# Patient Record
Sex: FEMALE | Race: BLACK OR AFRICAN AMERICAN | NOT HISPANIC OR LATINO | URBAN - METROPOLITAN AREA
[De-identification: names, ages, dates, MRNs, and addresses within clinical notes are randomized per-mention and may not be internally consistent; named-entity substitution may affect disease eponyms.]

---

## 2021-12-28 ENCOUNTER — OUTPATIENT (OUTPATIENT)
Dept: OUTPATIENT SERVICES | Facility: HOSPITAL | Age: 31
LOS: 1 days | End: 2021-12-28

## 2021-12-28 ENCOUNTER — APPOINTMENT (OUTPATIENT)
Dept: PRIMARY CARE | Facility: HOSPITAL | Age: 31
End: 2021-12-28

## 2021-12-28 VITALS
OXYGEN SATURATION: 100 % | WEIGHT: 190 LBS | TEMPERATURE: 98.1 F | DIASTOLIC BLOOD PRESSURE: 57 MMHG | HEIGHT: 66 IN | BODY MASS INDEX: 30.53 KG/M2 | HEART RATE: 62 BPM | SYSTOLIC BLOOD PRESSURE: 127 MMHG

## 2021-12-28 DIAGNOSIS — R52 PAIN, UNSPECIFIED: ICD-10-CM

## 2021-12-28 LAB
INFLUENZA A RESULT: NOT DETECTED
INFLUENZA B RESULT: NOT DETECTED
RESP SYN VIRUS RESULT: NOT DETECTED
SARS-COV-2 RESULT: NOT DETECTED

## 2021-12-28 NOTE — HISTORY OF PRESENT ILLNESS
[FreeTextEntry8] : 31F NKDA with no PMH and no PSH who came her due to fever and body aches.\par \par States that she had body aches and fever for 2 days. She was also exposed to + COVID co-worker for the past 2 days. She received th 2nd dose of COVID in October 2021 and did not receive the flu shot.  Denies any  cough, sore throat or SOB. No loss of smell or taste, no chest tightness, or any GI related symptoms of nausea, vomiting or diarrhea. 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 Does not / Does take regular maintenance medications. Denies any chest pain, no chest palpitations or any respiratory distress\par \par \par

## 2021-12-28 NOTE — PHYSICAL EXAM
[No Acute Distress] : no acute distress [Normal Sclera/Conjunctiva] : normal sclera/conjunctiva [Normal Outer Ear/Nose] : the outer ears and nose were normal in appearance [No Respiratory Distress] : no respiratory distress  [No Accessory Muscle Use] : no accessory muscle use [Clear to Auscultation] : lungs were clear to auscultation bilaterally [Regular Rhythm] : with a regular rhythm [Normal S1, S2] : normal S1 and S2 [No Focal Deficits] : no focal deficits [Normal Gait] : normal gait [Normal Affect] : the affect was normal [Normal Insight/Judgement] : insight and judgment were intact [de-identified] : no tonsular exudates, no tonsular erythema and no maxillary tenderness  [de-identified] : no wheezing, no rhonchi and no crackles

## 2022-04-01 ENCOUNTER — EMERGENCY (EMERGENCY)
Facility: HOSPITAL | Age: 32
LOS: 1 days | Discharge: HOME | End: 2022-04-01
Attending: STUDENT IN AN ORGANIZED HEALTH CARE EDUCATION/TRAINING PROGRAM | Admitting: STUDENT IN AN ORGANIZED HEALTH CARE EDUCATION/TRAINING PROGRAM
Payer: MEDICAID

## 2022-04-01 VITALS
RESPIRATION RATE: 18 BRPM | HEIGHT: 66 IN | HEART RATE: 80 BPM | OXYGEN SATURATION: 100 % | SYSTOLIC BLOOD PRESSURE: 138 MMHG | TEMPERATURE: 98 F | DIASTOLIC BLOOD PRESSURE: 60 MMHG

## 2022-04-01 LAB
ALBUMIN SERPL ELPH-MCNC: 4.2 G/DL — SIGNIFICANT CHANGE UP (ref 3.3–5)
ALP SERPL-CCNC: 55 U/L — SIGNIFICANT CHANGE UP (ref 40–120)
ALT FLD-CCNC: 18 U/L — SIGNIFICANT CHANGE UP (ref 4–33)
ANION GAP SERPL CALC-SCNC: 15 MMOL/L — HIGH (ref 7–14)
APPEARANCE UR: CLEAR — SIGNIFICANT CHANGE UP
AST SERPL-CCNC: 16 U/L — SIGNIFICANT CHANGE UP (ref 4–32)
BASOPHILS # BLD AUTO: 0.04 K/UL — SIGNIFICANT CHANGE UP (ref 0–0.2)
BASOPHILS NFR BLD AUTO: 0.5 % — SIGNIFICANT CHANGE UP (ref 0–2)
BILIRUB SERPL-MCNC: 0.2 MG/DL — SIGNIFICANT CHANGE UP (ref 0.2–1.2)
BILIRUB UR-MCNC: NEGATIVE — SIGNIFICANT CHANGE UP
BLD GP AB SCN SERPL QL: NEGATIVE — SIGNIFICANT CHANGE UP
BUN SERPL-MCNC: 10 MG/DL — SIGNIFICANT CHANGE UP (ref 7–23)
CALCIUM SERPL-MCNC: 8.9 MG/DL — SIGNIFICANT CHANGE UP (ref 8.4–10.5)
CHLORIDE SERPL-SCNC: 107 MMOL/L — SIGNIFICANT CHANGE UP (ref 98–107)
CO2 SERPL-SCNC: 23 MMOL/L — SIGNIFICANT CHANGE UP (ref 22–31)
COLOR SPEC: YELLOW — SIGNIFICANT CHANGE UP
CREAT SERPL-MCNC: 0.82 MG/DL — SIGNIFICANT CHANGE UP (ref 0.5–1.3)
DIFF PNL FLD: NEGATIVE — SIGNIFICANT CHANGE UP
EGFR: 97 ML/MIN/1.73M2 — SIGNIFICANT CHANGE UP
EOSINOPHIL # BLD AUTO: 0.16 K/UL — SIGNIFICANT CHANGE UP (ref 0–0.5)
EOSINOPHIL NFR BLD AUTO: 1.8 % — SIGNIFICANT CHANGE UP (ref 0–6)
GLUCOSE SERPL-MCNC: 100 MG/DL — HIGH (ref 70–99)
GLUCOSE UR QL: NEGATIVE — SIGNIFICANT CHANGE UP
HCG SERPL-ACNC: 571.2 MIU/ML — SIGNIFICANT CHANGE UP
HCT VFR BLD CALC: 39.1 % — SIGNIFICANT CHANGE UP (ref 34.5–45)
HGB BLD-MCNC: 13.3 G/DL — SIGNIFICANT CHANGE UP (ref 11.5–15.5)
IANC: 3.75 K/UL — SIGNIFICANT CHANGE UP (ref 1.8–7.4)
IMM GRANULOCYTES NFR BLD AUTO: 0.3 % — SIGNIFICANT CHANGE UP (ref 0–1.5)
KETONES UR-MCNC: NEGATIVE — SIGNIFICANT CHANGE UP
LEUKOCYTE ESTERASE UR-ACNC: NEGATIVE — SIGNIFICANT CHANGE UP
LYMPHOCYTES # BLD AUTO: 4.01 K/UL — HIGH (ref 1–3.3)
LYMPHOCYTES # BLD AUTO: 45.9 % — HIGH (ref 13–44)
MCHC RBC-ENTMCNC: 31.8 PG — SIGNIFICANT CHANGE UP (ref 27–34)
MCHC RBC-ENTMCNC: 34 GM/DL — SIGNIFICANT CHANGE UP (ref 32–36)
MCV RBC AUTO: 93.5 FL — SIGNIFICANT CHANGE UP (ref 80–100)
MONOCYTES # BLD AUTO: 0.74 K/UL — SIGNIFICANT CHANGE UP (ref 0–0.9)
MONOCYTES NFR BLD AUTO: 8.5 % — SIGNIFICANT CHANGE UP (ref 2–14)
NEUTROPHILS # BLD AUTO: 3.75 K/UL — SIGNIFICANT CHANGE UP (ref 1.8–7.4)
NEUTROPHILS NFR BLD AUTO: 43 % — SIGNIFICANT CHANGE UP (ref 43–77)
NITRITE UR-MCNC: NEGATIVE — SIGNIFICANT CHANGE UP
NRBC # BLD: 0 /100 WBCS — SIGNIFICANT CHANGE UP
NRBC # FLD: 0 K/UL — SIGNIFICANT CHANGE UP
PH UR: 6.5 — SIGNIFICANT CHANGE UP (ref 5–8)
PLATELET # BLD AUTO: 197 K/UL — SIGNIFICANT CHANGE UP (ref 150–400)
POTASSIUM SERPL-MCNC: 4.4 MMOL/L — SIGNIFICANT CHANGE UP (ref 3.5–5.3)
POTASSIUM SERPL-SCNC: 4.4 MMOL/L — SIGNIFICANT CHANGE UP (ref 3.5–5.3)
PROT SERPL-MCNC: 6.7 G/DL — SIGNIFICANT CHANGE UP (ref 6–8.3)
PROT UR-MCNC: ABNORMAL
RBC # BLD: 4.18 M/UL — SIGNIFICANT CHANGE UP (ref 3.8–5.2)
RBC # FLD: 13.2 % — SIGNIFICANT CHANGE UP (ref 10.3–14.5)
RH IG SCN BLD-IMP: POSITIVE — SIGNIFICANT CHANGE UP
SODIUM SERPL-SCNC: 145 MMOL/L — SIGNIFICANT CHANGE UP (ref 135–145)
SP GR SPEC: 1.03 — SIGNIFICANT CHANGE UP (ref 1–1.05)
UROBILINOGEN FLD QL: SIGNIFICANT CHANGE UP
WBC # BLD: 8.73 K/UL — SIGNIFICANT CHANGE UP (ref 3.8–10.5)
WBC # FLD AUTO: 8.73 K/UL — SIGNIFICANT CHANGE UP (ref 3.8–10.5)

## 2022-04-01 PROCEDURE — 76830 TRANSVAGINAL US NON-OB: CPT | Mod: 26

## 2022-04-01 PROCEDURE — 99285 EMERGENCY DEPT VISIT HI MDM: CPT

## 2022-04-01 NOTE — ED PROVIDER NOTE - CLINICAL SUMMARY MEDICAL DECISION MAKING FREE TEXT BOX
31yF  @ 10 weeks gestation by LMP  presenting with positive pregnancy test. Denies vaginal bleeding, pelvic pain, nausea, vomiting. On exam pt is well appearing. Will confirm IUP. Plan: cbc/cmp/hcg, ua/ucx, TVUS.

## 2022-04-01 NOTE — ED ADULT TRIAGE NOTE - CHIEF COMPLAINT QUOTE
Pt AOX4 c/o of some intermittent cramping, denies vaginal bleeding; had ++ uCG x 2 days ago; LMP 1/25/22  Pt also requests recommendation for OB/GYN provider

## 2022-04-01 NOTE — ED PROVIDER NOTE - ATTENDING CONTRIBUTION TO CARE
Zachery Mireles DO:  patient seen and evaluated with the PA.  I was present for key portions of the History & Physical, and I agree with the Impression & Plan. 32 yo f no reported pmh, , pw missed period. lkmp . had + home pregnancy two days prior. denies all acute sx including pelvic cramping, vb. reports started prenatals today. came for preg confirmation. arrives hds, well appearing. labs, imaging, reassess.

## 2022-04-01 NOTE — ED PROVIDER NOTE - NS ED ATTENDING STATEMENT MOD
This was a shared visit with the MICHAEL. I reviewed and verified the documentation and independently performed the documented:

## 2022-04-01 NOTE — ED PROVIDER NOTE - PROGRESS NOTE DETAILS
MEMO Vo: US with likely early gestational sac, hcg 571, paged OBGYN for consult. MEMO Vo: Pt seen by OBGYN, recommending 48 hours beta check and rpt US. Pt will return to ED on Sunday. She will return sooner with any concerning symptoms, pelvic pain, vaginal bleeding, weakness or any other concerns.

## 2022-04-01 NOTE — ED PROVIDER NOTE - PATIENT PORTAL LINK FT
You can access the FollowMyHealth Patient Portal offered by [facility] by registering at the following website: [email or web address]. By joining [employer]’s FollowMyHealth portal, you will also be able to view your health information using other applications (apps) compatible with our system.

## 2022-04-01 NOTE — ED PROVIDER NOTE - OBJECTIVE STATEMENT
31yF  @ 10 weeks gestation by LMP  presenting with positive pregnancy test 31yF  @ 10 weeks gestation by LMP  presenting with positive pregnancy test. Pt states she found out she was pregnant 2 days ago. She reports intermittent pelvic cramping. Pt states she does not have an OBGYN. Pt denies pelvic pain, vaginal bleeding, vaginal discharge, dysuria, urinary complaints, back pain, abd pain, nausea, vomiting, diarrhea, chest pain, shortness of breath, palpitations, headache, dizziness, weakness or any other concerns.

## 2022-04-01 NOTE — ED PROVIDER NOTE - NSFOLLOWUPINSTRUCTIONS_ED_ALL_ED_FT
Return to the ER in 48 hours on 4/03 to have repeat lab work (hcg level) and ultrasound  Return to the ER sooner with any worsening or concerning symptoms, pelvic pain, vaginal bleeding, weakness or any other concerns.

## 2022-04-02 PROCEDURE — 99243 OFF/OP CNSLTJ NEW/EST LOW 30: CPT

## 2022-04-02 NOTE — CONSULT NOTE ADULT - ASSESSMENT
30yo  LMP  (EGA 9w4d by LMP however patient reports irregular menses 2/2 PCOS) presenting to ED with +UPT and mild abodminal cramping. VSS. . Labs otherwise wnl. TVUS with small cystic structure and decidual reaction, no definitive IUP, no adnexal masses, no FF. Differential diagnosis at this time includes early IUP (viable v nonviable), cannot rule out ectopic pregnancy. No clinical or radiologic findings concerning for ruptured ectopic pregnancy.    Recs:   - Given patient has pregnancy of unknown location, no elevated concern for ectopic pregnancy, patient counseled on expectant management with serial bHCG and ultrasound as needed  - Patient counseled on importance of following up in setting of pregnancy of unknown location where ectopic cannot be ruled out, patient voices her understanding   - Patient to follow up in ED in 48hrs for repeat bHCG   - Patient is Rh positive and without bleeding, no rhogam indicated  - Strict return precautions discussed, patient to return to ED for heavy bleeding (soaking >1 pad in 1 hr), severe pain, dizziness/lightheadedness, fevers, chills, CP/SOB    Delilah Sparks PGY2   Patient seen w/ Dr. Farley

## 2022-04-02 NOTE — CONSULT NOTE ADULT - SUBJECTIVE AND OBJECTIVE BOX
ANAND DOWD  31y  Female [id number]    HPI:    30yo  LMP  (EGA 9w4d by LMP) w/ hx of PCOS presenting to ED with abd cramping, +UPT. Patient states that her last period was on  but she frequently goes multiple months without a period due to her PCOS. She noticed that for the last 2 weeks her breasts were becoming more tender, so she took a home pregnancy test yesterday which was positive. Due to some mild abdominal cramping, she presented to urgent care who recommended she be seen in the ED. She has not seen a physician during this pregnancy or had a sono yet. She denies vaginal bleeding or discharge. Is only having mild cramping, not requiring any pain medication. Denies sharp or severe abdominal pain, nausea, vomiting, lightheadedness/dizziness, CP, SOB.     Name of GYN Physician: Does not know, [facility] at [facility]    POB:  x1  Pgyn: Denies fibroids, cysts, endometriosis, STI's, Abnormal pap smears   PMHX: Denies  PSHx: Denies  Meds: None   All: NKDA  Social History:  Denies smoking use, drug use, alcohol use.       Vital Signs Last 24 Hrs  T(C): 36.7 (2022 19:37), Max: 36.7 (2022 19:37)  T(F): 98 (2022 19:37), Max: 98 (2022 19:37)  HR: 80 (2022 19:37) (80 - 80)  BP: 138/60 (2022 19:37) (138/60 - 138/60)  BP(mean): --  RR: 18 (2022 19:37) (18 - 18)  SpO2: 100% (2022 19:37) (100% - 100%)    Physical Exam:   General: sitting comfortably in bed, NAD   CV: RRR  Lungs: nl work of breathing   Abd: Soft, non-tender, non-distended.    :  No bleeding on pad.  External labia wnl.  Bimanual exam with no cervical motion tenderness, uterus wnl, adnexa non palpable and nontender bilaterally.  Cervix closed   Ext: non-tender b/l, no edema     LABS:                              13.3   8.73  )-----------( 197      ( 2022 21:36 )             39.1         145  |  107  |  10  ----------------------------<  100<H>  4.4   |  23  |  0.82    Ca    8.9      2022 21:36    TPro  6.7  /  Alb  4.2  /  TBili  0.2  /  DBili  x   /  AST  16  /  ALT  18  /  AlkPhos  55      I&O's Detail      Urinalysis Basic - ( 2022 21:36 )    Color: Yellow / Appearance: Clear / S.028 / pH: x  Gluc: x / Ketone: Negative  / Bili: Negative / Urobili: <2 mg/dL   Blood: x / Protein: Trace / Nitrite: Negative   Leuk Esterase: Negative / RBC: 2 /HPF / WBC 1 /HPF   Sq Epi: x / Non Sq Epi: 1 /HPF / Bacteria: Few        RADIOLOGY & ADDITIONAL STUDIES:  < from: US Transvaginal (22 @ 21:41) >    ACC: [id number] EXAM:  US TRANSVAGINAL                          PROCEDURE DATE:  2022          INTERPRETATION:  CLINICAL INFORMATION: Intermittent pelvic cramping,   confirm IUP.    LMP: 2022    COMPARISON: None available.    TECHNIQUE:  Endovaginal pelvic sonogram as per order. Transabdominal pelvic sonogram   was also performed as part of our standard protocol.    FINDINGS:    Uterus and endometrium: Uterus normal in size. Endometrium thickened up   to 1.6 cm. Small cystic structure with decidual reaction in the   endometrium with mean sac diameter of 0.2 cm.      Right ovary: 4.1 cm x 2.5 cm x 3.1 cm. Dominant follicle visualized.   Normal arterial and venous waveforms.  Left ovary: 2.8 cm x 2.0 cm x 1.8 cm. Within normal limits. Normal   arterial and venous waveforms.    Other: No suspicious adnexal mass.    Fluid: None.    IMPRESSION:    Small cystic structure with decidual reaction in the endometrium likely   representing an early gestational sac. Recommend serial beta hCG and   short interval follow-up sonogram to assess for normal progression of the   pregnancy.    --- End of Report ---          JESSICA RAMÍREZ DO; Resident Radiologist  This document has been electronically signed.  EDNA DAVIS DO; Attending Radiologist  This document has been electronically signed. 2022 10:36PM    < end of copied text >

## 2022-04-03 ENCOUNTER — EMERGENCY (EMERGENCY)
Facility: HOSPITAL | Age: 32
LOS: 1 days | Discharge: HOME | End: 2022-04-03
Attending: HOSPITALIST | Admitting: HOSPITALIST
Payer: MEDICAID

## 2022-04-03 VITALS
TEMPERATURE: 99 F | SYSTOLIC BLOOD PRESSURE: 138 MMHG | DIASTOLIC BLOOD PRESSURE: 69 MMHG | HEIGHT: 66 IN | HEART RATE: 68 BPM | OXYGEN SATURATION: 100 % | RESPIRATION RATE: 16 BRPM

## 2022-04-03 LAB
CULTURE RESULTS: SIGNIFICANT CHANGE UP
HCG SERPL-ACNC: 1037 MIU/ML — SIGNIFICANT CHANGE UP
SPECIMEN SOURCE: SIGNIFICANT CHANGE UP

## 2022-04-03 PROCEDURE — 99284 EMERGENCY DEPT VISIT MOD MDM: CPT

## 2022-04-03 NOTE — ED ADULT TRIAGE NOTE - IDEAL BODY WEIGHT(KG)
Problem: Physical Therapy Goal  Goal: Physical Therapy Goal  Description: PT goals until 10/10/20    1. Pt supine to sit with mod independent-not met  2. Pt sit to supine with mod independent-not met  3. Pt sit to stand with RW with mod independent-not met  4. Pt to perform gait 150ft with RW with supervision.-not met  5. Pt to go up/down curb step with RW with SBA.-not met  6. Pt to perform B LE exs in sitting or supine x 10 reps to strengthen B LE to improve functional mobility.-not met    Outcome: Ongoing, Progressing   Pt's goals set and pt will benefit from skilled PT services to work towards improved functional mobility including: bed mobility, transfers, up/down step, and gait.   Lora Zhao, PT  10/3/2020     59

## 2022-04-03 NOTE — ED PROVIDER NOTE - OBJECTIVE STATEMENT
30 y/o female, , estimated gestational age approximately 10 weeks, LMP , presenting to the emergency department for repeat hCG testing. On review of medical charts from 22, patient was seen here two days ago for pelvic cramping, pregnancy confirmed with confirmed IUP, and was seen by OBGYN, who recommended her to return to the ED for repeat hCG testing in 48 hours. Patient was also given a referral to an OBGYN, and plans to follow up in the coming weeks. Patient currently denies pelvic complaints. Patient denies fever, chills, chest pain, shortness of breath, abdominal pain, nausea, vomiting, diarrhea, dysuria, hematuria, or urinary frequency.

## 2022-04-03 NOTE — ED ADULT NURSE NOTE - CHIEF COMPLAINT
The patient is a 31y Female complaining of  Equal and normal pulses (carotid, femoral, dorsalis pedis)

## 2022-04-03 NOTE — ED PROVIDER NOTE - PATIENT PORTAL LINK FT
You can access the FollowMyHealth Patient Portal offered by [facility] by registering at the following website: [email or web address]. By joining [employer]’s FollowMyHealth portal, you will also be able to view your health information using other applications (apps) compatible with our system.

## 2022-04-03 NOTE — ED ADULT NURSE NOTE - OBJECTIVE STATEMENT
Patient is a 32 yo female, h/x PCOS, presenting for HCG check. Patient reports LMP in January, denies abdominal pain, cramping, vaginal bleeding. AAOx4, no signs of distress. Lab drawn as ordered, patient discharged by MD King.

## 2022-04-03 NOTE — ED PROVIDER NOTE - CLINICAL SUMMARY MEDICAL DECISION MAKING FREE TEXT BOX
32 y/o female coming in for repeat hCG testing. Will order labs, and have patient follow up with outpatient GYN.

## 2022-04-03 NOTE — ED PROVIDER NOTE - NS_ ATTENDINGSCRIBEDETAILS _ED_A_ED_FT
Rebecca King MD: The history, relevant review of systems, past medical and surgical history, medical decision making, and physical examination was documented by the scribe in my presence and I attest to the accuracy of the documentation.

## 2022-04-04 ENCOUNTER — NON-APPOINTMENT (OUTPATIENT)
Age: 32
End: 2022-04-04

## 2022-04-04 PROBLEM — Z78.9 OTHER SPECIFIED HEALTH STATUS: Chronic | Status: ACTIVE | Noted: 2022-04-01

## 2022-04-05 ENCOUNTER — APPOINTMENT (OUTPATIENT)
Dept: OBGYN | Facility: HOSPITAL | Age: 32
End: 2022-04-05
Payer: MEDICAID

## 2022-04-05 ENCOUNTER — OUTPATIENT (OUTPATIENT)
Dept: OUTPATIENT SERVICES | Facility: HOSPITAL | Age: 32
LOS: 1 days | End: 2022-04-05

## 2022-04-05 ENCOUNTER — RESULT REVIEW (OUTPATIENT)
Age: 32
End: 2022-04-05

## 2022-04-05 VITALS
TEMPERATURE: 97.2 F | SYSTOLIC BLOOD PRESSURE: 138 MMHG | DIASTOLIC BLOOD PRESSURE: 60 MMHG | BODY MASS INDEX: 33.27 KG/M2 | HEART RATE: 73 BPM | HEIGHT: 66 IN | WEIGHT: 207 LBS

## 2022-04-05 DIAGNOSIS — Z00.00 ENCOUNTER FOR GENERAL ADULT MEDICAL EXAMINATION W/OUT ABNORMAL FINDINGS: ICD-10-CM

## 2022-04-05 DIAGNOSIS — O36.80X0 PREGNANCY WITH INCONCLUSIVE FETAL VIABILITY, NOT APPLICABLE OR UNSPECIFIED: ICD-10-CM

## 2022-04-05 LAB — HCG SERPL-ACNC: 1793 MIU/ML — SIGNIFICANT CHANGE UP

## 2022-04-05 PROCEDURE — 76830 TRANSVAGINAL US NON-OB: CPT | Mod: 26

## 2022-04-05 PROCEDURE — 99213 OFFICE O/P EST LOW 20 MIN: CPT | Mod: GE,25

## 2022-04-05 NOTE — HISTORY OF PRESENT ILLNESS
[FreeTextEntry1] : 30y/o  LMP   presents for bhcg followup. Pt feels well without complaints. Denies vaginal bleeding, abdominal pain, nausea, vomiting. \par Of note, pt reports irregular menses 2/2 PCOS, often goes multiple months without a period. \par \par Bhcg trend: 571()->1037(4/3)->1793()\par \par TVUS (): Small cystic structure with decidual reaction in the endometrium likely\par representing an early gestational sac\par \par \par \par POB:  x1\par Pgyn: Denies fibroids, cysts, endometriosis, STI's, Abnormal pap smears\par PMHX: Denies\par PSHx: Denies\par Meds: None\par All: NKDA\par Social History: Denies smoking use, drug use, alcohol use.

## 2022-04-05 NOTE — PROCEDURE
[Transvaginal OB Sonogram] : Transvaginal OB Sonogram [Intrauterine Pregnancy] : intrauterine pregnancy [Yolk Sac] : yolk sac present [FreeTextEntry1] : Gestational sac with yolk sac visualized today

## 2022-04-05 NOTE — PHYSICAL EXAM
[Chaperone Present] : A chaperone was present in the examining room during all aspects of the physical examination [Regular Rate Rhythm] : regular rate rhythm [Clear to Auscultation B/L] : clear to auscultation bilaterally [Soft] : soft [Non-tender] : non-tender [Non-distended] : non-distended [FreeTextEntry1] : SERGIO Howe

## 2022-04-06 ENCOUNTER — TRANSCRIPTION ENCOUNTER (OUTPATIENT)
Age: 32
End: 2022-04-06

## 2022-04-06 DIAGNOSIS — O36.80X0 PREGNANCY WITH INCONCLUSIVE FETAL VIABILITY, NOT APPLICABLE OR UNSPECIFIED: ICD-10-CM

## 2022-04-11 ENCOUNTER — APPOINTMENT (OUTPATIENT)
Dept: OBGYN | Facility: CLINIC | Age: 32
End: 2022-04-11

## 2022-04-16 ENCOUNTER — APPOINTMENT (OUTPATIENT)
Dept: ULTRASOUND IMAGING | Facility: CLINIC | Age: 32
End: 2022-04-16
Payer: MEDICAID

## 2022-04-16 PROCEDURE — 76817 TRANSVAGINAL US OBSTETRIC: CPT

## 2022-05-26 ENCOUNTER — APPOINTMENT (OUTPATIENT)
Dept: OBGYN | Facility: HOSPITAL | Age: 32
End: 2022-05-26

## 2022-05-26 ENCOUNTER — APPOINTMENT (OUTPATIENT)
Dept: ANTEPARTUM | Facility: CLINIC | Age: 32
End: 2022-05-26

## 2022-10-03 ENCOUNTER — APPOINTMENT (OUTPATIENT)
Dept: MATERNAL FETAL MEDICINE | Facility: CLINIC | Age: 32
End: 2022-10-03

## 2022-10-03 ENCOUNTER — ASOB RESULT (OUTPATIENT)
Age: 32
End: 2022-10-03

## 2022-10-03 DIAGNOSIS — O24.419 GESTATIONAL DIABETES MELLITUS IN PREGNANCY, UNSPECIFIED CONTROL: ICD-10-CM

## 2022-10-03 PROCEDURE — G0109 DIAB MANAGE TRN IND/GROUP: CPT | Mod: 95

## 2022-10-03 RX ORDER — LANCETS 33 GAUGE
EACH MISCELLANEOUS
Qty: 4 | Refills: 2 | Status: ACTIVE | COMMUNITY
Start: 2022-10-03 | End: 1900-01-01

## 2022-10-03 RX ORDER — URINE ACETONE TEST STRIPS
STRIP MISCELLANEOUS
Qty: 1 | Refills: 2 | Status: ACTIVE | COMMUNITY
Start: 2022-10-03 | End: 1900-01-01

## 2022-10-03 RX ORDER — BLOOD-GLUCOSE METER
KIT MISCELLANEOUS 4 TIMES DAILY
Qty: 2 | Refills: 1 | Status: ACTIVE | COMMUNITY
Start: 2022-10-03 | End: 1900-01-01

## 2022-10-03 RX ORDER — BLOOD-GLUCOSE METER
W/DEVICE KIT MISCELLANEOUS
Qty: 1 | Refills: 0 | Status: ACTIVE | COMMUNITY
Start: 2022-10-03 | End: 1900-01-01

## 2022-10-12 ENCOUNTER — ASOB RESULT (OUTPATIENT)
Age: 32
End: 2022-10-12

## 2022-10-12 ENCOUNTER — APPOINTMENT (OUTPATIENT)
Dept: ANTEPARTUM | Facility: CLINIC | Age: 32
End: 2022-10-12

## 2022-10-12 ENCOUNTER — APPOINTMENT (OUTPATIENT)
Dept: MATERNAL FETAL MEDICINE | Facility: CLINIC | Age: 32
End: 2022-10-12

## 2022-10-12 PROCEDURE — 76819 FETAL BIOPHYS PROFIL W/O NST: CPT

## 2022-10-12 PROCEDURE — 76811 OB US DETAILED SNGL FETUS: CPT

## 2022-11-07 ENCOUNTER — APPOINTMENT (OUTPATIENT)
Dept: ANTEPARTUM | Facility: CLINIC | Age: 32
End: 2022-11-07

## 2022-11-07 ENCOUNTER — ASOB RESULT (OUTPATIENT)
Age: 32
End: 2022-11-07

## 2022-11-07 PROCEDURE — 76816 OB US FOLLOW-UP PER FETUS: CPT

## 2022-11-07 PROCEDURE — 76819 FETAL BIOPHYS PROFIL W/O NST: CPT | Mod: 59

## 2022-11-08 ENCOUNTER — APPOINTMENT (OUTPATIENT)
Dept: ANTEPARTUM | Facility: CLINIC | Age: 32
End: 2022-11-08

## 2022-11-15 ENCOUNTER — APPOINTMENT (OUTPATIENT)
Dept: ANTEPARTUM | Facility: CLINIC | Age: 32
End: 2022-11-15

## 2022-11-15 ENCOUNTER — ASOB RESULT (OUTPATIENT)
Age: 32
End: 2022-11-15

## 2022-11-15 PROCEDURE — 76818 FETAL BIOPHYS PROFILE W/NST: CPT

## 2022-11-21 ENCOUNTER — APPOINTMENT (OUTPATIENT)
Dept: ANTEPARTUM | Facility: CLINIC | Age: 32
End: 2022-11-21

## 2022-11-21 ENCOUNTER — ASOB RESULT (OUTPATIENT)
Age: 32
End: 2022-11-21

## 2022-11-21 PROCEDURE — 76816 OB US FOLLOW-UP PER FETUS: CPT

## 2022-11-21 PROCEDURE — 76818 FETAL BIOPHYS PROFILE W/NST: CPT | Mod: 59

## 2022-11-24 ENCOUNTER — INPATIENT (INPATIENT)
Facility: HOSPITAL | Age: 32
LOS: 1 days | Discharge: HOME | End: 2022-11-26
Attending: OBSTETRICS & GYNECOLOGY | Admitting: OBSTETRICS & GYNECOLOGY

## 2022-11-24 ENCOUNTER — RESULT REVIEW (OUTPATIENT)
Age: 32
End: 2022-11-24

## 2022-11-24 VITALS
DIASTOLIC BLOOD PRESSURE: 75 MMHG | RESPIRATION RATE: 14 BRPM | TEMPERATURE: 98 F | SYSTOLIC BLOOD PRESSURE: 138 MMHG | HEART RATE: 92 BPM

## 2022-11-24 DIAGNOSIS — O26.899 OTHER SPECIFIED PREGNANCY RELATED CONDITIONS, UNSPECIFIED TRIMESTER: ICD-10-CM

## 2022-11-24 DIAGNOSIS — Z3A.00 WEEKS OF GESTATION OF PREGNANCY NOT SPECIFIED: ICD-10-CM

## 2022-11-24 LAB
ALBUMIN SERPL ELPH-MCNC: 3.3 G/DL — SIGNIFICANT CHANGE UP (ref 3.3–5)
ALP SERPL-CCNC: 164 U/L — HIGH (ref 40–120)
ALT FLD-CCNC: 13 U/L — SIGNIFICANT CHANGE UP (ref 4–33)
ANION GAP SERPL CALC-SCNC: 15 MMOL/L — HIGH (ref 7–14)
APTT BLD: 25.9 SEC — LOW (ref 27–36.3)
AST SERPL-CCNC: 21 U/L — SIGNIFICANT CHANGE UP (ref 4–32)
BASOPHILS # BLD AUTO: 0.02 K/UL — SIGNIFICANT CHANGE UP (ref 0–0.2)
BASOPHILS # BLD AUTO: 0.03 K/UL — SIGNIFICANT CHANGE UP (ref 0–0.2)
BASOPHILS NFR BLD AUTO: 0.1 % — SIGNIFICANT CHANGE UP (ref 0–2)
BASOPHILS NFR BLD AUTO: 0.3 % — SIGNIFICANT CHANGE UP (ref 0–2)
BILIRUB SERPL-MCNC: 0.2 MG/DL — SIGNIFICANT CHANGE UP (ref 0.2–1.2)
BLD GP AB SCN SERPL QL: NEGATIVE — SIGNIFICANT CHANGE UP
BUN SERPL-MCNC: 8 MG/DL — SIGNIFICANT CHANGE UP (ref 7–23)
CALCIUM SERPL-MCNC: 8.8 MG/DL — SIGNIFICANT CHANGE UP (ref 8.4–10.5)
CHLORIDE SERPL-SCNC: 108 MMOL/L — HIGH (ref 98–107)
CO2 SERPL-SCNC: 16 MMOL/L — LOW (ref 22–31)
COVID-19 SPIKE DOMAIN AB INTERP: POSITIVE
COVID-19 SPIKE DOMAIN ANTIBODY RESULT: >250 U/ML — HIGH
CREAT SERPL-MCNC: 0.77 MG/DL — SIGNIFICANT CHANGE UP (ref 0.5–1.3)
EGFR: 104 ML/MIN/1.73M2 — SIGNIFICANT CHANGE UP
EOSINOPHIL # BLD AUTO: 0.01 K/UL — SIGNIFICANT CHANGE UP (ref 0–0.5)
EOSINOPHIL # BLD AUTO: 0.11 K/UL — SIGNIFICANT CHANGE UP (ref 0–0.5)
EOSINOPHIL NFR BLD AUTO: 0.1 % — SIGNIFICANT CHANGE UP (ref 0–6)
EOSINOPHIL NFR BLD AUTO: 1 % — SIGNIFICANT CHANGE UP (ref 0–6)
GLUCOSE BLDC GLUCOMTR-MCNC: 100 MG/DL — HIGH (ref 70–99)
GLUCOSE SERPL-MCNC: 96 MG/DL — SIGNIFICANT CHANGE UP (ref 70–99)
HCT VFR BLD CALC: 31.2 % — LOW (ref 34.5–45)
HCT VFR BLD CALC: 31.5 % — LOW (ref 34.5–45)
HGB BLD-MCNC: 10.2 G/DL — LOW (ref 11.5–15.5)
HGB BLD-MCNC: 10.3 G/DL — LOW (ref 11.5–15.5)
IANC: 13.46 K/UL — HIGH (ref 1.8–7.4)
IANC: 7.24 K/UL — SIGNIFICANT CHANGE UP (ref 1.8–7.4)
IMM GRANULOCYTES NFR BLD AUTO: 0.4 % — SIGNIFICANT CHANGE UP (ref 0–0.9)
IMM GRANULOCYTES NFR BLD AUTO: 0.7 % — SIGNIFICANT CHANGE UP (ref 0–0.9)
INR BLD: 0.98 RATIO — SIGNIFICANT CHANGE UP (ref 0.88–1.16)
LDH SERPL L TO P-CCNC: 251 U/L — HIGH (ref 135–225)
LYMPHOCYTES # BLD AUTO: 1.1 K/UL — SIGNIFICANT CHANGE UP (ref 1–3.3)
LYMPHOCYTES # BLD AUTO: 2.21 K/UL — SIGNIFICANT CHANGE UP (ref 1–3.3)
LYMPHOCYTES # BLD AUTO: 20.8 % — SIGNIFICANT CHANGE UP (ref 13–44)
LYMPHOCYTES # BLD AUTO: 7.1 % — LOW (ref 13–44)
MCHC RBC-ENTMCNC: 29.1 PG — SIGNIFICANT CHANGE UP (ref 27–34)
MCHC RBC-ENTMCNC: 29.2 PG — SIGNIFICANT CHANGE UP (ref 27–34)
MCHC RBC-ENTMCNC: 32.4 GM/DL — SIGNIFICANT CHANGE UP (ref 32–36)
MCHC RBC-ENTMCNC: 33 GM/DL — SIGNIFICANT CHANGE UP (ref 32–36)
MCV RBC AUTO: 88.1 FL — SIGNIFICANT CHANGE UP (ref 80–100)
MCV RBC AUTO: 90.3 FL — SIGNIFICANT CHANGE UP (ref 80–100)
MONOCYTES # BLD AUTO: 0.81 K/UL — SIGNIFICANT CHANGE UP (ref 0–0.9)
MONOCYTES # BLD AUTO: 0.95 K/UL — HIGH (ref 0–0.9)
MONOCYTES NFR BLD AUTO: 5.2 % — SIGNIFICANT CHANGE UP (ref 2–14)
MONOCYTES NFR BLD AUTO: 9 % — SIGNIFICANT CHANGE UP (ref 2–14)
NEUTROPHILS # BLD AUTO: 13.46 K/UL — HIGH (ref 1.8–7.4)
NEUTROPHILS # BLD AUTO: 7.24 K/UL — SIGNIFICANT CHANGE UP (ref 1.8–7.4)
NEUTROPHILS NFR BLD AUTO: 68.2 % — SIGNIFICANT CHANGE UP (ref 43–77)
NEUTROPHILS NFR BLD AUTO: 87.1 % — HIGH (ref 43–77)
NRBC # BLD: 0 /100 WBCS — SIGNIFICANT CHANGE UP (ref 0–0)
NRBC # BLD: 0 /100 WBCS — SIGNIFICANT CHANGE UP (ref 0–0)
NRBC # FLD: 0 K/UL — SIGNIFICANT CHANGE UP (ref 0–0)
NRBC # FLD: 0 K/UL — SIGNIFICANT CHANGE UP (ref 0–0)
PLATELET # BLD AUTO: 219 K/UL — SIGNIFICANT CHANGE UP (ref 150–400)
PLATELET # BLD AUTO: 231 K/UL — SIGNIFICANT CHANGE UP (ref 150–400)
POTASSIUM SERPL-MCNC: 3.9 MMOL/L — SIGNIFICANT CHANGE UP (ref 3.5–5.3)
POTASSIUM SERPL-SCNC: 3.9 MMOL/L — SIGNIFICANT CHANGE UP (ref 3.5–5.3)
PROT SERPL-MCNC: 6 G/DL — SIGNIFICANT CHANGE UP (ref 6–8.3)
PROTHROM AB SERPL-ACNC: 11.4 SEC — SIGNIFICANT CHANGE UP (ref 10.5–13.4)
RBC # BLD: 3.49 M/UL — LOW (ref 3.8–5.2)
RBC # BLD: 3.54 M/UL — LOW (ref 3.8–5.2)
RBC # FLD: 14.2 % — SIGNIFICANT CHANGE UP (ref 10.3–14.5)
RBC # FLD: 14.4 % — SIGNIFICANT CHANGE UP (ref 10.3–14.5)
RH IG SCN BLD-IMP: POSITIVE — SIGNIFICANT CHANGE UP
SARS-COV-2 IGG+IGM SERPL QL IA: >250 U/ML — HIGH
SARS-COV-2 IGG+IGM SERPL QL IA: POSITIVE
SARS-COV-2 RNA SPEC QL NAA+PROBE: SIGNIFICANT CHANGE UP
SODIUM SERPL-SCNC: 139 MMOL/L — SIGNIFICANT CHANGE UP (ref 135–145)
T PALLIDUM AB TITR SER: NEGATIVE — SIGNIFICANT CHANGE UP
URATE SERPL-MCNC: 4.8 MG/DL — SIGNIFICANT CHANGE UP (ref 2.5–7)
WBC # BLD: 10.61 K/UL — HIGH (ref 3.8–10.5)
WBC # BLD: 15.46 K/UL — HIGH (ref 3.8–10.5)
WBC # FLD AUTO: 10.61 K/UL — HIGH (ref 3.8–10.5)
WBC # FLD AUTO: 15.46 K/UL — HIGH (ref 3.8–10.5)

## 2022-11-24 PROCEDURE — 88307 TISSUE EXAM BY PATHOLOGIST: CPT | Mod: 26

## 2022-11-24 RX ORDER — IBUPROFEN 200 MG
600 TABLET ORAL EVERY 6 HOURS
Refills: 0 | Status: COMPLETED | OUTPATIENT
Start: 2022-11-24 | End: 2023-10-23

## 2022-11-24 RX ORDER — ACETAMINOPHEN 500 MG
975 TABLET ORAL
Refills: 0 | Status: DISCONTINUED | OUTPATIENT
Start: 2022-11-24 | End: 2022-11-26

## 2022-11-24 RX ORDER — OXYTOCIN 10 UNIT/ML
41.67 VIAL (ML) INJECTION
Qty: 20 | Refills: 0 | Status: DISCONTINUED | OUTPATIENT
Start: 2022-11-24 | End: 2022-11-25

## 2022-11-24 RX ORDER — SODIUM CHLORIDE 9 MG/ML
1000 INJECTION, SOLUTION INTRAVENOUS
Refills: 0 | Status: DISCONTINUED | OUTPATIENT
Start: 2022-11-24 | End: 2022-11-24

## 2022-11-24 RX ORDER — INFLUENZA VIRUS VACCINE 15; 15; 15; 15 UG/.5ML; UG/.5ML; UG/.5ML; UG/.5ML
0.5 SUSPENSION INTRAMUSCULAR ONCE
Refills: 0 | Status: DISCONTINUED | OUTPATIENT
Start: 2022-11-24 | End: 2022-11-26

## 2022-11-24 RX ORDER — PRAMOXINE HYDROCHLORIDE 150 MG/15G
1 AEROSOL, FOAM RECTAL EVERY 4 HOURS
Refills: 0 | Status: DISCONTINUED | OUTPATIENT
Start: 2022-11-24 | End: 2022-11-26

## 2022-11-24 RX ORDER — KETOROLAC TROMETHAMINE 30 MG/ML
30 SYRINGE (ML) INJECTION ONCE
Refills: 0 | Status: DISCONTINUED | OUTPATIENT
Start: 2022-11-24 | End: 2022-11-24

## 2022-11-24 RX ORDER — DIBUCAINE 1 %
1 OINTMENT (GRAM) RECTAL EVERY 6 HOURS
Refills: 0 | Status: DISCONTINUED | OUTPATIENT
Start: 2022-11-24 | End: 2022-11-26

## 2022-11-24 RX ORDER — NIFEDIPINE 30 MG
30 TABLET, EXTENDED RELEASE 24 HR ORAL DAILY
Refills: 0 | Status: DISCONTINUED | OUTPATIENT
Start: 2022-11-24 | End: 2022-11-26

## 2022-11-24 RX ORDER — MAGNESIUM HYDROXIDE 400 MG/1
30 TABLET, CHEWABLE ORAL
Refills: 0 | Status: DISCONTINUED | OUTPATIENT
Start: 2022-11-24 | End: 2022-11-26

## 2022-11-24 RX ORDER — BENZOCAINE 10 %
1 GEL (GRAM) MUCOUS MEMBRANE EVERY 6 HOURS
Refills: 0 | Status: DISCONTINUED | OUTPATIENT
Start: 2022-11-24 | End: 2022-11-26

## 2022-11-24 RX ORDER — SIMETHICONE 80 MG/1
80 TABLET, CHEWABLE ORAL EVERY 4 HOURS
Refills: 0 | Status: DISCONTINUED | OUTPATIENT
Start: 2022-11-24 | End: 2022-11-26

## 2022-11-24 RX ORDER — OXYTOCIN 10 UNIT/ML
333.33 VIAL (ML) INJECTION
Qty: 20 | Refills: 0 | Status: DISCONTINUED | OUTPATIENT
Start: 2022-11-24 | End: 2022-11-25

## 2022-11-24 RX ORDER — AER TRAVELER 0.5 G/1
1 SOLUTION RECTAL; TOPICAL EVERY 4 HOURS
Refills: 0 | Status: DISCONTINUED | OUTPATIENT
Start: 2022-11-24 | End: 2022-11-26

## 2022-11-24 RX ORDER — KETOROLAC TROMETHAMINE 30 MG/ML
30 SYRINGE (ML) INJECTION ONCE
Refills: 0 | Status: DISCONTINUED | OUTPATIENT
Start: 2022-11-24 | End: 2022-11-26

## 2022-11-24 RX ORDER — TETANUS TOXOID, REDUCED DIPHTHERIA TOXOID AND ACELLULAR PERTUSSIS VACCINE, ADSORBED 5; 2.5; 8; 8; 2.5 [IU]/.5ML; [IU]/.5ML; UG/.5ML; UG/.5ML; UG/.5ML
0.5 SUSPENSION INTRAMUSCULAR ONCE
Refills: 0 | Status: DISCONTINUED | OUTPATIENT
Start: 2022-11-24 | End: 2022-11-26

## 2022-11-24 RX ORDER — LANOLIN
1 OINTMENT (GRAM) TOPICAL EVERY 6 HOURS
Refills: 0 | Status: DISCONTINUED | OUTPATIENT
Start: 2022-11-24 | End: 2022-11-26

## 2022-11-24 RX ORDER — HYDROCORTISONE 1 %
1 OINTMENT (GRAM) TOPICAL EVERY 6 HOURS
Refills: 0 | Status: DISCONTINUED | OUTPATIENT
Start: 2022-11-24 | End: 2022-11-26

## 2022-11-24 RX ORDER — OXYCODONE HYDROCHLORIDE 5 MG/1
5 TABLET ORAL
Refills: 0 | Status: DISCONTINUED | OUTPATIENT
Start: 2022-11-24 | End: 2022-11-26

## 2022-11-24 RX ORDER — DIPHENHYDRAMINE HCL 50 MG
25 CAPSULE ORAL EVERY 6 HOURS
Refills: 0 | Status: DISCONTINUED | OUTPATIENT
Start: 2022-11-24 | End: 2022-11-26

## 2022-11-24 RX ORDER — SODIUM CHLORIDE 9 MG/ML
3 INJECTION INTRAMUSCULAR; INTRAVENOUS; SUBCUTANEOUS EVERY 8 HOURS
Refills: 0 | Status: DISCONTINUED | OUTPATIENT
Start: 2022-11-24 | End: 2022-11-26

## 2022-11-24 RX ORDER — OXYCODONE HYDROCHLORIDE 5 MG/1
5 TABLET ORAL ONCE
Refills: 0 | Status: DISCONTINUED | OUTPATIENT
Start: 2022-11-24 | End: 2022-11-26

## 2022-11-24 RX ORDER — SODIUM CHLORIDE 9 MG/ML
1000 INJECTION INTRAMUSCULAR; INTRAVENOUS; SUBCUTANEOUS
Refills: 0 | Status: DISCONTINUED | OUTPATIENT
Start: 2022-11-24 | End: 2022-11-24

## 2022-11-24 RX ORDER — IBUPROFEN 200 MG
600 TABLET ORAL EVERY 6 HOURS
Refills: 0 | Status: DISCONTINUED | OUTPATIENT
Start: 2022-11-24 | End: 2022-11-26

## 2022-11-24 RX ORDER — SODIUM CHLORIDE 9 MG/ML
500 INJECTION, SOLUTION INTRAVENOUS ONCE
Refills: 0 | Status: DISCONTINUED | OUTPATIENT
Start: 2022-11-24 | End: 2022-11-24

## 2022-11-24 RX ORDER — CITRIC ACID/SODIUM CITRATE 300-500 MG
15 SOLUTION, ORAL ORAL EVERY 6 HOURS
Refills: 0 | Status: DISCONTINUED | OUTPATIENT
Start: 2022-11-24 | End: 2022-11-24

## 2022-11-24 RX ADMIN — Medication 30 MILLIGRAM(S): at 22:36

## 2022-11-24 RX ADMIN — Medication 975 MILLIGRAM(S): at 21:05

## 2022-11-24 RX ADMIN — SODIUM CHLORIDE 3 MILLILITER(S): 9 INJECTION INTRAMUSCULAR; INTRAVENOUS; SUBCUTANEOUS at 22:46

## 2022-11-24 RX ADMIN — Medication 600 MILLIGRAM(S): at 18:06

## 2022-11-24 RX ADMIN — Medication 30 MILLIGRAM(S): at 16:00

## 2022-11-24 RX ADMIN — Medication 30 MILLIGRAM(S): at 15:45

## 2022-11-24 RX ADMIN — Medication 975 MILLIGRAM(S): at 21:35

## 2022-11-24 RX ADMIN — SODIUM CHLORIDE 3 MILLILITER(S): 9 INJECTION INTRAMUSCULAR; INTRAVENOUS; SUBCUTANEOUS at 16:25

## 2022-11-24 RX ADMIN — Medication 1000 MILLIUNIT(S)/MIN: at 15:15

## 2022-11-24 NOTE — OB NEONATOLOGY/PEDIATRICIAN DELIVERY SUMMARY - NSPEDSNEONOTESA_OBGYN_ALL_OB_FT
Peds called to LDR/OR for .  wk SGA/LGA female / male born via  / CS to a  y/o G mother.  Maternal history of _. Prenatal history of _ or No significant maternal or prenatal history. Maternal labs include Blood Type   , HIV - , RPR NR , Rubella I , Hep B - , GBS +/- _____, COVID +/-. * ROM at __ with clear / meconium fluids (ROM hours: ___). Baby emerged vigorous, crying, was warmed, dried suctioned and stimulated with APGARS of / . Resuscitation included: . Mom plans to initiate breastfeeding / formula feed, consents / declines Hep B vaccine and consents / declines circ.  Highest maternal temp: . EOS  . 39wk AGA male born via  to a 35 y/o  mother. Maternal history of GDMA1. Maternal labs include Blood Type O+, HIV -, RPR NR, Rubella I, Hep B - , GBS - (), COVID -. SROM at 0945 with clear fluids with some blood. Baby emerged vigorous, crying, was warmed, dried suctioned and stimulated with APGARS of 7/8 for tone and color. Mom plans to initiate bottle feed, consents Hep B vaccine and consents circ.  Highest maternal temp: 36.7. EOS 0.06.   Lt shoulder presented anteriorly upon delivery.     Physical Exam:  Gen: no acute distress, +grimace  HEENT:  anterior fontanel open soft and flat, nondysmoprhic facies, no cleft lip/palate, ears normal set, no ear pits or tags, nares clinically patent, + caput  Resp: Normal respiratory effort without grunting or retractions, good air entry b/l, clear to auscultation bilaterally  Cardio: Present S1/S2, regular rate and rhythm, no murmurs  Abd: soft, non tender, non distended, umbilical cord with 3 vessels  Neuro: +palmar and plantar grasp, +suck, +mikaela, normal tone  Extremities: negative clancy and ortolani maneuvers, moving all extremities but moving Rt arm relatively less than Lt, no obvious clavicular crepitus or stepoff, good  strength B/L  Skin: pink, warm  Genitals: Normal male anatomy, testicles palpable in scrotum b/l], Dariel 1, anus patent

## 2022-11-24 NOTE — OB RN PATIENT PROFILE - FALL HARM RISK - FALLEN IN PAST
Manual Repair Warning Statement: We plan on removing the manually selected variable below in favor of our much easier automatic structured text blocks found in the previous tab. We decided to do this to help make the flow better and give you the full power of structured data. Manual selection is never going to be ideal in our platform and I would encourage you to avoid using manual selection from this point on, especially since I will be sunsetting this feature. It is important that you do one of two things with the customized text below. First, you can save all of the text in a word file so you can have it for future reference. Second, transfer the text to the appropriate area in the Library tab. Lastly, if there is a flap or graft type which we do not have you need to let us know right away so I can add it in before the variable is hidden. No need to panic, we plan to give you roughly 6 months to make the change. No

## 2022-11-24 NOTE — OB PROVIDER DELIVERY SUMMARY - NSLOWPPHRISK_OBGYN_A_OB
No previous uterine incision/Mora Pregnancy/Less than or equal to 4 previous vaginal births/No known bleeding disorder/No history of postpartum hemorrhage

## 2022-11-24 NOTE — OB PROVIDER H&P - BLOOD TRANSFUSION, PREVIOUS, PROFILE
Seated heel raises x30    added 3/2   Great toe ext x30       2-5 ext x30             theraband-all planes x20ea Purple  Grey for PF                Arch \" doming\" x20     Toe raises-sitting x30            hindfoot/midfoot/forefoot ROM with therpist  12 min             OKC 4 way hip in boot x30 blue x10 ea LE on 3/26 with grey band  OKC and CKC on LLE in CAM boot         squats 10x  R foot on airex pad- focusing on WBing, due to rocker bottom on CAM boot, unable to keep heel in contact with ground  CAM Boot on             Other:      Specific Instructions for next treatment: AROM; Treatment Charges: Mins Units   []  Modalities     [x]  Ther Exercise 40 3   []  Manual Therapy     []  Ther Activities     []  Aquatics     []  Vasocompression     []  Other: Total Treatment time 40 3       Assessment: [x] Progressing toward goals; Pt denies an increase in left ankle or foot pain at the end of the session. Pt was able to self-progress to WBAT in the CAM boot without incidence and denies any pain since full WBing. Pt reports if he does have pain, it will be in the heel of the foot and pt was educated on possible heel pain with increased WBing. Pt was able to recall all exercises with cues to emphasize proper motion. Progressions of resistance bands for 4-way ankle were provided with cues to focus on full ranges of motion that is pain-free. Pt was also able to complete WBing activities in his CAM boot without increased pain and with proper form. Pt was provided with an updated HEP in order to complete during the COVID-19 clinic shut down. Pt was educated to continue to complete exercises daily, especially stretching in order to prepare for ambulating without CAM boot. [] No change.      [] Other:      STG/LTG  STG: (to be met in 8 treatments)  1. ? Pain: maintain NWB status (no pain when NWB) 2/21-began 50% weight bearing per protocol-cleared 2/17; 3/2 began 75% weight bearing; 100% on 3/26  2. ? ROM:
no

## 2022-11-24 NOTE — OB RN DELIVERY SUMMARY - NSSELHIDDEN_OBGYN_ALL_OB_FT
[NS_DeliveryAttending1_OBGYN_ALL_OB_FT:[id number]],[NS_DeliveryRN_OBGYN_ALL_OB_FT:[id number]=]

## 2022-11-24 NOTE — OB RN PATIENT PROFILE - FALL HARM RISK - UNIVERSAL INTERVENTIONS
Bed in lowest position, wheels locked, appropriate side rails in place/Call bell, personal items and telephone in reach/Instruct patient to call for assistance before getting out of bed or chair/Non-slip footwear when patient is out of bed/[place] to call system/Physically safe environment - no spills, clutter or unnecessary equipment/Purposeful Proactive Rounding/Room/bathroom lighting operational, light cord in reach

## 2022-11-24 NOTE — OB PROVIDER DELIVERY SUMMARY - NSSHOULDDYSTOCIA_OBGYN_ALL_OB
received pt A&Ox3 in no apparent distress at this time. #20g IVL L AC, bloods drawn and sent to the lab. no s/s of infiltration noted at this time. family and MD at bedside. vss. pt c/o weakness and slight dizziness at this time. will monitor closely. dispo pending Baby A

## 2022-11-24 NOTE — OB PROVIDER DELIVERY SUMMARY - NSPROVIDERDELIVERYNOTE_OBGYN_ALL_OB_FT
OVER MEDIAN EPISIOTOMY  DELIVERED A LIVE MALE BABY FROM OA POSITION  SHOULD DYSTOCIA NOTED (LEFT ANTERIOR)--SHOULDER CODE CALLED  ARTHUR AND SUPRAPUBIC PRESSURE WITH SUCCESSFUL DELIVERY OF SHOULDERS LESS THAN ONE MINUTE AFTER DELIVERY OF HEAD  CORD GASES SENT  PLACENTA DELIVERED SPONT AND COMPLETE---SENT TO PATH  MEDIAN EPIS REPAIRED WITH A 2-0 CHROMIC IN ROUTINE FASHION  NO LACERATIONS  BABY EVALUATED BY PEDS IN DELIVERY ROOM AND WAS STABLE--REGULAR NURSERY

## 2022-11-24 NOTE — CHART NOTE - NSCHARTNOTEFT_GEN_A_CORE
R1 EVENT NOTE    S: Patient evaluated for /78. She denies sxs sPEC: HA, vision changes, SOB, epigastric pain. Repeat /85.    Vital Signs Last 24 Hrs  T(C): 36.9 (2022 17:08), Max: 36.9 (2022 14:15)  T(F): 98.4 (2022 17:08), Max: 98.4 (2022 14:15)  HR: 100 (2022 18:59) (78 - 131)  BP: 149/68 (2022 18:59) (123/59 - 178/77)  BP(mean): --  RR: 18 (2022 17:08) (14 - 18)  SpO2: 100% (2022 17:08) (83% - 100%)               10.2   15.46 )-----------( 219      ( 11-24 @ 14:30 )             31.5     11-24 @ 14:30    139  |  108  |  8   ----------------------------<  96  3.9   |  16  |  0.77      PT/INR - ( 2022 14:30 )   PT: 11.4 sec;   INR: 0.98 ratio       PTT - ( 2022 14:30 )  PTT:25.9 sec    LIVER FUNCTIONS - ( 2022 14:30 )  Alb: 3.3 g/dL / Pro: 6.0 g/dL / ALK PHOS: 164 U/L / ALT: 13 U/L / AST: 21 U/L / GGT: x             A/P: 31yo PPD0  with gHTN and mild range elevated BP  - Repeat BP in 1h  - No sxs sPEC  - HELLP wnl  - Discussed diagnosis of gHTN and potential for progression to preeclampsia/eclampsia. Pt demonstrated understanding.    Adelina Davis, PGY1  d/w Dr. Barger

## 2022-11-24 NOTE — OB RN TRIAGE NOTE - HEART RATE (BEATS/MIN)
ESRD on hemodialysis ESRD on hemodialysis ESRD on hemodialysis ESRD on hemodialysis ESRD on hemodialysis ESRD on hemodialysis ESRD on hemodialysis ESRD on hemodialysis ESRD on hemodialysis 92

## 2022-11-24 NOTE — OB RN TRIAGE NOTE - FALL HARM RISK - UNIVERSAL INTERVENTIONS
Bed in lowest position, wheels locked, appropriate side rails in place/Call bell, personal items and telephone in reach/Instruct patient to call for assistance before getting out of bed or chair/Non-slip footwear when patient is out of bed/[place] to call system/Physically safe environment - no spills, clutter or unnecessary equipment/Purposeful Proactive Rounding/Room/bathroom lighting operational, light cord in reach

## 2022-11-24 NOTE — OB RN DELIVERY SUMMARY - NS_SEPSISRSKCALC_OBGYN_ALL_OB_FT
GBS status in the 'Prenatal Lab tests/results section' on the OB RN Patient Profile must be documented.   EOS calculated successfully. EOS Risk Factor: 0.5/1000 live births ([facility] national incidence); GA=39w;Temp=98.4; ROM=4.15; GBS='Negative'; Antibiotics='No antibiotics or any antibiotics < 2 hrs prior to birth'

## 2022-11-24 NOTE — OB PROVIDER H&P - ASSESSMENT
Ms. ANAND DOWD is a 32y  39w multip presenting with rupture of membranes at 9AM this morning followed by painful contractions that started at 09:45 c/w active labor and SROM @ 09:45. EFW 3487.     Grossly ruptured. Leaking light pink, thin watery fluid at external genitalia. No erythema, edema, lesions at external genitalia. Unable to visualize cervix due to active pooling of fluid and collapse of vaginal walls. Positive Nitrazine. Positive Ferning. 6 / 70 / -2. Category 1. Tocometer with regular contractions every 2-3 minutes. Vertex.     Plan  - Admit to labor and delivery for active labor and SROM @ 09:45  - For epidural   - Patient and partner informed of plan and demonstrate understanding. All questions answered. Consents signed. Covid swabbed and sent.     Plan d/w Dr. Barger

## 2022-11-24 NOTE — OB PROVIDER TRIAGE NOTE - NSHPPHYSICALEXAM_GEN_ALL_CORE
T(C): 36.7 (11-24-22 @ 11:49), Max: 36.7 (11-24-22 @ 11:49)  HR: 85 (11-24-22 @ 12:29) (78 - 92)  BP: 129/74 (11-24-22 @ 12:29) (129/74 - 142/70)  RR: 14 (11-24-22 @ 11:49) (14 - 14)  SpO2: --  General: Female visibly uncomfortable, asking for epidural, breathing through the contractions.   Head: Normocephalic. Atraumatic.   Eyes: No discharge, lids normal, conjunctiva normal  Lungs: No resp distress  Abdomen: Soft, nontender. Gravid.   SSE: Grossly ruptured. Leaking light pink, thin watery fluid at external genitalia. No erythema, edema, lesions at external genitalia. Unable to visualize cervix due to active pooling of fluid and collapse of vaginal walls. Positive Nitrazine. Positive Ferning.   SVE: 6 / 70 / -2  NST: 120bpm. Moderate variability. Early decelerations present. 15x15 acceleration present. Category 1. Tocometer with regular contractions every 2-3 minutes.   TAUS: Vertex. Done to confirm presentation   Neuro: No facial asymmetry, no slurred speech, moves all 4 extremities  Mood: Alert and lucid, appropriate mood and affect

## 2022-11-24 NOTE — OB PROVIDER TRIAGE NOTE - HISTORY OF PRESENT ILLNESS
Ms. ANAND DOWD is a 32y  37w presenting with rupture of membranes at 9AM this morning followed by painful contractions that started at 09:45. Contractions are 10/10, desires epidural. EFW [id number].   PNC: GDMA1. Pt denies complications with blood pressure.    OBHx:   - 12 nsd 5#  GynHx:  - Denies   PMH:   - Denies   PSH:   - Denies   Psych:   - Denies   Social:   - Denies   Meds:   - PNV

## 2022-11-25 LAB
ALBUMIN SERPL ELPH-MCNC: 3.1 G/DL — LOW (ref 3.3–5)
ALP SERPL-CCNC: 142 U/L — HIGH (ref 40–120)
ALT FLD-CCNC: 14 U/L — SIGNIFICANT CHANGE UP (ref 4–33)
ANION GAP SERPL CALC-SCNC: 10 MMOL/L — SIGNIFICANT CHANGE UP (ref 7–14)
APTT BLD: 28.6 SEC — SIGNIFICANT CHANGE UP (ref 27–36.3)
AST SERPL-CCNC: 27 U/L — SIGNIFICANT CHANGE UP (ref 4–32)
BASOPHILS # BLD AUTO: 0.03 K/UL — SIGNIFICANT CHANGE UP (ref 0–0.2)
BASOPHILS NFR BLD AUTO: 0.2 % — SIGNIFICANT CHANGE UP (ref 0–2)
BILIRUB SERPL-MCNC: <0.2 MG/DL — SIGNIFICANT CHANGE UP (ref 0.2–1.2)
BUN SERPL-MCNC: 6 MG/DL — LOW (ref 7–23)
CALCIUM SERPL-MCNC: 7.9 MG/DL — LOW (ref 8.4–10.5)
CHLORIDE SERPL-SCNC: 107 MMOL/L — SIGNIFICANT CHANGE UP (ref 98–107)
CO2 SERPL-SCNC: 21 MMOL/L — LOW (ref 22–31)
CREAT SERPL-MCNC: 0.75 MG/DL — SIGNIFICANT CHANGE UP (ref 0.5–1.3)
EGFR: 107 ML/MIN/1.73M2 — SIGNIFICANT CHANGE UP
EOSINOPHIL # BLD AUTO: 0.17 K/UL — SIGNIFICANT CHANGE UP (ref 0–0.5)
EOSINOPHIL NFR BLD AUTO: 1.3 % — SIGNIFICANT CHANGE UP (ref 0–6)
GLUCOSE SERPL-MCNC: 117 MG/DL — HIGH (ref 70–99)
HCT VFR BLD CALC: 28 % — LOW (ref 34.5–45)
HGB BLD-MCNC: 9 G/DL — LOW (ref 11.5–15.5)
IANC: 9.22 K/UL — HIGH (ref 1.8–7.4)
IMM GRANULOCYTES NFR BLD AUTO: 0.6 % — SIGNIFICANT CHANGE UP (ref 0–0.9)
INR BLD: 0.9 RATIO — SIGNIFICANT CHANGE UP (ref 0.88–1.16)
LDH SERPL L TO P-CCNC: 329 U/L — HIGH (ref 135–225)
LYMPHOCYTES # BLD AUTO: 2.69 K/UL — SIGNIFICANT CHANGE UP (ref 1–3.3)
LYMPHOCYTES # BLD AUTO: 20.1 % — SIGNIFICANT CHANGE UP (ref 13–44)
MAGNESIUM SERPL-MCNC: 3.9 MG/DL — HIGH (ref 1.6–2.6)
MAGNESIUM SERPL-MCNC: 4.5 MG/DL — HIGH (ref 1.6–2.6)
MCHC RBC-ENTMCNC: 29.2 PG — SIGNIFICANT CHANGE UP (ref 27–34)
MCHC RBC-ENTMCNC: 32.1 GM/DL — SIGNIFICANT CHANGE UP (ref 32–36)
MCV RBC AUTO: 90.9 FL — SIGNIFICANT CHANGE UP (ref 80–100)
MONOCYTES # BLD AUTO: 1.16 K/UL — HIGH (ref 0–0.9)
MONOCYTES NFR BLD AUTO: 8.7 % — SIGNIFICANT CHANGE UP (ref 2–14)
NEUTROPHILS # BLD AUTO: 9.22 K/UL — HIGH (ref 1.8–7.4)
NEUTROPHILS NFR BLD AUTO: 69.1 % — SIGNIFICANT CHANGE UP (ref 43–77)
NRBC # BLD: 0 /100 WBCS — SIGNIFICANT CHANGE UP (ref 0–0)
NRBC # FLD: 0 K/UL — SIGNIFICANT CHANGE UP (ref 0–0)
PLATELET # BLD AUTO: 216 K/UL — SIGNIFICANT CHANGE UP (ref 150–400)
POTASSIUM SERPL-MCNC: 4 MMOL/L — SIGNIFICANT CHANGE UP (ref 3.5–5.3)
POTASSIUM SERPL-SCNC: 4 MMOL/L — SIGNIFICANT CHANGE UP (ref 3.5–5.3)
PROT SERPL-MCNC: 5.9 G/DL — LOW (ref 6–8.3)
PROTHROM AB SERPL-ACNC: 10.4 SEC — LOW (ref 10.5–13.4)
RBC # BLD: 3.08 M/UL — LOW (ref 3.8–5.2)
RBC # FLD: 14.9 % — HIGH (ref 10.3–14.5)
SODIUM SERPL-SCNC: 138 MMOL/L — SIGNIFICANT CHANGE UP (ref 135–145)
URATE SERPL-MCNC: 4.7 MG/DL — SIGNIFICANT CHANGE UP (ref 2.5–7)
WBC # BLD: 13.35 K/UL — HIGH (ref 3.8–10.5)
WBC # FLD AUTO: 13.35 K/UL — HIGH (ref 3.8–10.5)

## 2022-11-25 RX ORDER — MAGNESIUM SULFATE 500 MG/ML
4 VIAL (ML) INJECTION ONCE
Refills: 0 | Status: COMPLETED | OUTPATIENT
Start: 2022-11-25 | End: 2022-11-25

## 2022-11-25 RX ORDER — SODIUM CHLORIDE 9 MG/ML
1000 INJECTION, SOLUTION INTRAVENOUS
Refills: 0 | Status: DISCONTINUED | OUTPATIENT
Start: 2022-11-25 | End: 2022-11-26

## 2022-11-25 RX ORDER — LABETALOL HCL 100 MG
200 TABLET ORAL DAILY
Refills: 0 | Status: DISCONTINUED | OUTPATIENT
Start: 2022-11-25 | End: 2022-11-26

## 2022-11-25 RX ORDER — MAGNESIUM SULFATE 500 MG/ML
2 VIAL (ML) INJECTION
Qty: 40 | Refills: 0 | Status: DISCONTINUED | OUTPATIENT
Start: 2022-11-25 | End: 2022-11-26

## 2022-11-25 RX ADMIN — Medication 50 GM/HR: at 01:36

## 2022-11-25 RX ADMIN — Medication 600 MILLIGRAM(S): at 01:16

## 2022-11-25 RX ADMIN — Medication 600 MILLIGRAM(S): at 01:46

## 2022-11-25 RX ADMIN — Medication 975 MILLIGRAM(S): at 09:17

## 2022-11-25 RX ADMIN — Medication 975 MILLIGRAM(S): at 20:08

## 2022-11-25 RX ADMIN — Medication 975 MILLIGRAM(S): at 14:17

## 2022-11-25 RX ADMIN — Medication 600 MILLIGRAM(S): at 18:20

## 2022-11-25 RX ADMIN — Medication 50 GM/HR: at 07:22

## 2022-11-25 RX ADMIN — Medication 975 MILLIGRAM(S): at 10:00

## 2022-11-25 RX ADMIN — Medication 50 GM/HR: at 19:15

## 2022-11-25 RX ADMIN — Medication 1 APPLICATION(S): at 23:37

## 2022-11-25 RX ADMIN — Medication 600 MILLIGRAM(S): at 18:55

## 2022-11-25 RX ADMIN — Medication 200 MILLIGRAM(S): at 13:58

## 2022-11-25 RX ADMIN — Medication 975 MILLIGRAM(S): at 21:08

## 2022-11-25 RX ADMIN — Medication 975 MILLIGRAM(S): at 15:11

## 2022-11-25 RX ADMIN — Medication 300 GRAM(S): at 01:12

## 2022-11-25 RX ADMIN — Medication 30 MILLIGRAM(S): at 22:16

## 2022-11-25 NOTE — CHART NOTE - NSCHARTNOTEFT_GEN_A_CORE
Patient with sustained severe range BP to 160s.  Now meets criteria for sPEC.  Patient to be started on MgSO4 for seizure ppx.    Discussed with Dr. Denita Ruano PGY1 Delayed note 2/2 to clinical duties  Patient seen at approximately 9:30pm due to repeat elevated BP.  Upon arrival to room, patient resting comfortably in bed.  Endorsed feeling well.  Patient denies headache, vision changes, chest pain, shortness of breath, epigastric pain, RUQ pain, new swelling in extremities.      Exam:   Gen: NAD  CV: RRR, S1, S2  Lungs: CTAB  Abd: soft, nontender, nondistended    A/P: Patient is PPD0 from  c/b gHTN with elevated BP  - Start Procardia 30XL  - Monitor for s/s PEC      Addendum 12:00am  Patient with sustained severe range BP to 160s.  Meets criteria for sPEC.  Patient to be started on MgSO4 for seizure ppx.    Discussed with Dr. Denita Ruano PGY1

## 2022-11-25 NOTE — PROVIDER CONTACT NOTE (OTHER) - BACKGROUND
NSD 22 at 1354. Para . Male . QBL: 250.  on Mag infusion
pt PEC with SF,  2 grams of mg
NSD 22 at 1354. Male . Para 2002. QBL: 250.   Hx: gHTN, GDMA1, NSD 2012

## 2022-11-25 NOTE — CHART NOTE - NSCHARTNOTEFT_GEN_A_CORE
R1 EVENT NOTE    S: Pt checked for elevated BP of 150/83. 15 minute repeat was 150/91. 30 minute repeat after this was 152/81. Pt has mild headache that is relieved by Tylenol. She denies vision changes, SOB, epigastric pain.    Vital Signs Last 24 Hrs  T(C): 37.1 (2022 12:15), Max: 37.1 (2022 08:00)  T(F): 98.7 (2022 12:15), Max: 98.7 (2022 08:00)  HR: 100 (2022 13:00) (90 - 131)  BP: 152/81 (2022 13:00) (101/75 - 175/78)  BP(mean): --  RR: 18 (2022 12:15) (18 - 18)  SpO2: 100% (:15) (83% - 100%)               9.0    13.35 )-----------( 216      (  @ 06:55 )             28.0      @ 06:55    138  |  107  |  6   ----------------------------<  117  4.0   |  21  |  0.75    PT/INR - ( 2022 06:55 )   PT: 10.4 sec;   INR: 0.90 ratio         PTT - ( 2022 06:55 )  PTT:28.6 sec    LIVER FUNCTIONS - ( 2022 06:55 )  Alb: 3.1 g/dL / Pro: 5.9 g/dL / ALK PHOS: 142 U/L / ALT: 14 U/L / AST: 27 U/L / GGT: x               A/P: 31yo PPD1  with sPEC by BP on Magnesium sulfate infusion  - Add labetalol 200 BID  - No need for immediate release medication at this time  - HELLP wnl  - Continue Mg until  @1:30am    Adelina Davis, PGY1  d/w Dr. Aguayo

## 2022-11-25 NOTE — PROVIDER CONTACT NOTE (OTHER) - SITUATION
BP of 150/91 HR 98
Patient complains of tension headache around eyes 6/10; Tylenol given at 8pm.
Patient complains new onset of rash on b/l ankle.

## 2022-11-25 NOTE — PROVIDER CONTACT NOTE (OTHER) - ACTION/TREATMENT ORDERED:
Provider aware. Will assess patient
MD to assess pt at bedside and call back .
Provider aware. Notify if symptoms get worse.

## 2022-11-26 ENCOUNTER — TRANSCRIPTION ENCOUNTER (OUTPATIENT)
Age: 32
End: 2022-11-26

## 2022-11-26 VITALS
OXYGEN SATURATION: 100 % | DIASTOLIC BLOOD PRESSURE: 89 MMHG | RESPIRATION RATE: 18 BRPM | TEMPERATURE: 98 F | HEART RATE: 97 BPM | SYSTOLIC BLOOD PRESSURE: 128 MMHG

## 2022-11-26 LAB
ALBUMIN SERPL ELPH-MCNC: 3 G/DL — LOW (ref 3.3–5)
ALP SERPL-CCNC: 124 U/L — HIGH (ref 40–120)
ALT FLD-CCNC: 15 U/L — SIGNIFICANT CHANGE UP (ref 4–33)
ANION GAP SERPL CALC-SCNC: 13 MMOL/L — SIGNIFICANT CHANGE UP (ref 7–14)
APTT BLD: 26.3 SEC — LOW (ref 27–36.3)
AST SERPL-CCNC: 22 U/L — SIGNIFICANT CHANGE UP (ref 4–32)
BASOPHILS # BLD AUTO: 0.02 K/UL — SIGNIFICANT CHANGE UP (ref 0–0.2)
BASOPHILS NFR BLD AUTO: 0.2 % — SIGNIFICANT CHANGE UP (ref 0–2)
BILIRUB SERPL-MCNC: <0.2 MG/DL — SIGNIFICANT CHANGE UP (ref 0.2–1.2)
BUN SERPL-MCNC: 8 MG/DL — SIGNIFICANT CHANGE UP (ref 7–23)
CALCIUM SERPL-MCNC: 7.5 MG/DL — LOW (ref 8.4–10.5)
CHLORIDE SERPL-SCNC: 105 MMOL/L — SIGNIFICANT CHANGE UP (ref 98–107)
CO2 SERPL-SCNC: 20 MMOL/L — LOW (ref 22–31)
CREAT SERPL-MCNC: 0.74 MG/DL — SIGNIFICANT CHANGE UP (ref 0.5–1.3)
EGFR: 109 ML/MIN/1.73M2 — SIGNIFICANT CHANGE UP
EOSINOPHIL # BLD AUTO: 0.23 K/UL — SIGNIFICANT CHANGE UP (ref 0–0.5)
EOSINOPHIL NFR BLD AUTO: 2 % — SIGNIFICANT CHANGE UP (ref 0–6)
GLUCOSE SERPL-MCNC: 143 MG/DL — HIGH (ref 70–99)
HCT VFR BLD CALC: 24.7 % — LOW (ref 34.5–45)
HCT VFR BLD CALC: 28.5 % — LOW (ref 34.5–45)
HGB BLD-MCNC: 7.7 G/DL — LOW (ref 11.5–15.5)
HGB BLD-MCNC: 8.9 G/DL — LOW (ref 11.5–15.5)
IANC: 7.76 K/UL — HIGH (ref 1.8–7.4)
IMM GRANULOCYTES NFR BLD AUTO: 0.5 % — SIGNIFICANT CHANGE UP (ref 0–0.9)
INR BLD: <0.9 RATIO — LOW (ref 0.88–1.16)
LDH SERPL L TO P-CCNC: 334 U/L — HIGH (ref 135–225)
LYMPHOCYTES # BLD AUTO: 2.56 K/UL — SIGNIFICANT CHANGE UP (ref 1–3.3)
LYMPHOCYTES # BLD AUTO: 22.1 % — SIGNIFICANT CHANGE UP (ref 13–44)
MAGNESIUM SERPL-MCNC: 4.4 MG/DL — HIGH (ref 1.6–2.6)
MCHC RBC-ENTMCNC: 28.8 PG — SIGNIFICANT CHANGE UP (ref 27–34)
MCHC RBC-ENTMCNC: 28.8 PG — SIGNIFICANT CHANGE UP (ref 27–34)
MCHC RBC-ENTMCNC: 31.2 GM/DL — LOW (ref 32–36)
MCHC RBC-ENTMCNC: 31.2 GM/DL — LOW (ref 32–36)
MCV RBC AUTO: 92.2 FL — SIGNIFICANT CHANGE UP (ref 80–100)
MCV RBC AUTO: 92.5 FL — SIGNIFICANT CHANGE UP (ref 80–100)
MONOCYTES # BLD AUTO: 0.93 K/UL — HIGH (ref 0–0.9)
MONOCYTES NFR BLD AUTO: 8 % — SIGNIFICANT CHANGE UP (ref 2–14)
NEUTROPHILS # BLD AUTO: 7.76 K/UL — HIGH (ref 1.8–7.4)
NEUTROPHILS NFR BLD AUTO: 67.2 % — SIGNIFICANT CHANGE UP (ref 43–77)
NRBC # BLD: 0 /100 WBCS — SIGNIFICANT CHANGE UP (ref 0–0)
NRBC # BLD: 0 /100 WBCS — SIGNIFICANT CHANGE UP (ref 0–0)
NRBC # FLD: 0 K/UL — SIGNIFICANT CHANGE UP (ref 0–0)
NRBC # FLD: 0 K/UL — SIGNIFICANT CHANGE UP (ref 0–0)
PLATELET # BLD AUTO: 213 K/UL — SIGNIFICANT CHANGE UP (ref 150–400)
PLATELET # BLD AUTO: 251 K/UL — SIGNIFICANT CHANGE UP (ref 150–400)
POTASSIUM SERPL-MCNC: 3.5 MMOL/L — SIGNIFICANT CHANGE UP (ref 3.5–5.3)
POTASSIUM SERPL-SCNC: 3.5 MMOL/L — SIGNIFICANT CHANGE UP (ref 3.5–5.3)
PROT SERPL-MCNC: 5.5 G/DL — LOW (ref 6–8.3)
PROTHROM AB SERPL-ACNC: 10 SEC — LOW (ref 10.5–13.4)
RBC # BLD: 2.67 M/UL — LOW (ref 3.8–5.2)
RBC # BLD: 3.09 M/UL — LOW (ref 3.8–5.2)
RBC # FLD: 15.1 % — HIGH (ref 10.3–14.5)
RBC # FLD: 15.4 % — HIGH (ref 10.3–14.5)
SODIUM SERPL-SCNC: 138 MMOL/L — SIGNIFICANT CHANGE UP (ref 135–145)
URATE SERPL-MCNC: 4.7 MG/DL — SIGNIFICANT CHANGE UP (ref 2.5–7)
WBC # BLD: 11.56 K/UL — HIGH (ref 3.8–10.5)
WBC # BLD: 12.32 K/UL — HIGH (ref 3.8–10.5)
WBC # FLD AUTO: 11.56 K/UL — HIGH (ref 3.8–10.5)
WBC # FLD AUTO: 12.32 K/UL — HIGH (ref 3.8–10.5)

## 2022-11-26 RX ORDER — LABETALOL HCL 100 MG
1 TABLET ORAL
Qty: 60 | Refills: 0
Start: 2022-11-26 | End: 2022-12-25

## 2022-11-26 RX ORDER — NIFEDIPINE 30 MG
1 TABLET, EXTENDED RELEASE 24 HR ORAL
Qty: 30 | Refills: 0
Start: 2022-11-26 | End: 2022-12-25

## 2022-11-26 RX ORDER — ACETAMINOPHEN 500 MG
3 TABLET ORAL
Qty: 0 | Refills: 0 | DISCHARGE
Start: 2022-11-26

## 2022-11-26 RX ORDER — NIFEDIPINE 30 MG
1 TABLET, EXTENDED RELEASE 24 HR ORAL
Qty: 0 | Refills: 0 | DISCHARGE
Start: 2022-11-26

## 2022-11-26 RX ORDER — LABETALOL HCL 100 MG
1 TABLET ORAL
Qty: 0 | Refills: 0 | DISCHARGE
Start: 2022-11-26

## 2022-11-26 RX ORDER — IBUPROFEN 200 MG
1 TABLET ORAL
Qty: 0 | Refills: 0 | DISCHARGE
Start: 2022-11-26

## 2022-11-26 RX ADMIN — Medication 600 MILLIGRAM(S): at 12:04

## 2022-11-26 RX ADMIN — Medication 600 MILLIGRAM(S): at 00:52

## 2022-11-26 RX ADMIN — Medication 600 MILLIGRAM(S): at 00:22

## 2022-11-26 RX ADMIN — Medication 200 MILLIGRAM(S): at 13:38

## 2022-11-26 NOTE — DISCHARGE NOTE OB - CARE PROVIDER_API CALL
Joellen Mcintyre  OBSTETRICS AND GYNECOLOGY  [id number] [place] [address]  Phone: [phone]  Fax: [phone]  Established Patient  Follow Up Time:

## 2022-11-26 NOTE — DISCHARGE NOTE OB - CARE PLAN
Principal Discharge DX:	 (normal spontaneous vaginal delivery)  Assessment and plan of treatment:	Routine pp care  Secondary Diagnosis:	Preeclampsia, third trimester  Assessment and plan of treatment:	Cont BP monitoring TID, call office if BP >140.90  PEC prec reviewed   labetalol  mg  Procardia 30mg daily  FU w OBGYN in 3-5 days for BP check   1

## 2022-11-26 NOTE — DISCHARGE NOTE OB - PLAN OF CARE
Routine pp care Cont BP monitoring TID, call office if BP >140.90  PEC prec reviewed   labetalol  mg  Procardia 30mg daily  FU w OBGYN in 3-5 days for BP check

## 2022-11-26 NOTE — DISCHARGE NOTE OB - PATIENT PORTAL LINK FT
You can access the FollowMyHealth Patient Portal offered by [facility] by registering at the following website: [email or web address]. By joining [facility]’s FollowMyHealth portal, you will also be able to view your health information using other applications (apps) compatible with our system.

## 2022-11-26 NOTE — PROGRESS NOTE ADULT - SUBJECTIVE AND OBJECTIVE BOX
R1 Progress Note    Patient seen and examined at bedside. Patient with complaint of headache overnight that resolved with Tylenol and rest.  Denies continued headache at this time.  No acute complaints, pain well controlled. Patient is ambulating, voiding spontaneously, passing gas, and tolerating regular diet. Denies CP, SOB, N/V, HA, blurred vision, epigastric pain.    Vital Signs Last 24 Hours  T(C): 36.9 (11-26-22 @ 05:48), Max: 37.1 (11-25-22 @ 10:15)  HR: 97 (11-26-22 @ 05:48) (87 - 100)  BP: 139/68 (11-26-22 @ 05:48) (121/66 - 152/83)  RR: 18 (11-26-22 @ 05:48) (17 - 18)  SpO2: 100% (11-26-22 @ 05:48) (97% - 100%)    Physical Exam:  General: NAD  Abdomen: Soft, non-tender, non-distended, fundus firm  Pelvic: Lochia wnl    Labs:    Blood Type: O Positive  Antibody Screen: Negative  RPR: Negative               8.9    12.32 )-----------( 251      ( 11-26 @ 08:00 )             28.5                7.7    11.56 )-----------( 213      ( 11-26 @ 00:52 )             24.7                9.0    13.35 )-----------( 216      ( 11-25 @ 06:55 )             28.0         MEDICATIONS  (STANDING):  acetaminophen     Tablet .. 975 milliGRAM(s) Oral <User Schedule>  diphtheria/tetanus/pertussis (acellular) Vaccine (Adacel) 0.5 milliLiter(s) IntraMuscular once  ibuprofen  Tablet. 600 milliGRAM(s) Oral every 6 hours  influenza   Vaccine 0.5 milliLiter(s) IntraMuscular once  ketorolac   Injectable 30 milliGRAM(s) IV Push once  labetalol 200 milliGRAM(s) Oral daily  lactated ringers. 1000 milliLiter(s) (50 mL/Hr) IV Continuous <Continuous>  NIFEdipine XL 30 milliGRAM(s) Oral daily  prenatal multivitamin 1 Tablet(s) Oral daily  sodium chloride 0.9% lock flush 3 milliLiter(s) IV Push every 8 hours    MEDICATIONS  (PRN):  benzocaine 20%/menthol 0.5% Spray 1 Spray(s) Topical every 6 hours PRN for Perineal discomfort  dibucaine 1% Ointment 1 Application(s) Topical every 6 hours PRN Perineal discomfort  diphenhydrAMINE 25 milliGRAM(s) Oral every 6 hours PRN Pruritus  hydrocortisone 1% Cream 1 Application(s) Topical every 6 hours PRN Moderate Pain (4-6)  lanolin Ointment 1 Application(s) Topical every 6 hours PRN nipple soreness  magnesium hydroxide Suspension 30 milliLiter(s) Oral two times a day PRN Constipation  oxyCODONE    IR 5 milliGRAM(s) Oral every 3 hours PRN Moderate to Severe Pain (4-10)  oxyCODONE    IR 5 milliGRAM(s) Oral once PRN Moderate to Severe Pain (4-10)  pramoxine 1%/zinc 5% Cream 1 Application(s) Topical every 4 hours PRN Moderate Pain (4-6)  simethicone 80 milliGRAM(s) Chew every 4 hours PRN Gas  witch hazel Pads 1 Application(s) Topical every 4 hours PRN Perineal discomfort  
OB Progress Note:  PPD#1    S: 31yo PPD#1 s/p  w/ shoulder c/b PEC w/ SF (currently on Mg). Pain controlled. Patient tolerating regular diet, voiding spontaneously, and ambulating. Denies significant VB, chest pain, shortness of breath, n/v, light-headedness/dizziness. Denies headache or scotomas      O:  Vitals:  Vital Signs Last 24 Hrs  T(C): 36.7 (2022 05:15), Max: 36.9 (2022 14:15)  T(F): 98.1 (2022 05:15), Max: 98.4 (2022 14:15)  HR: 99 (2022 05:15) (78 - 131)  BP: 128/78 (2022 05:15) (101/75 - 178/77)  BP(mean): --  RR: 18 (2022 05:15) (14 - 18)  SpO2: 98% (2022 05:15) (83% - 100%)        MEDICATIONS  (STANDING):  acetaminophen     Tablet .. 975 milliGRAM(s) Oral <User Schedule>  diphtheria/tetanus/pertussis (acellular) Vaccine (Adacel) 0.5 milliLiter(s) IntraMuscular once  ibuprofen  Tablet. 600 milliGRAM(s) Oral every 6 hours  influenza   Vaccine 0.5 milliLiter(s) IntraMuscular once  ketorolac   Injectable 30 milliGRAM(s) IV Push once  lactated ringers. 1000 milliLiter(s) (50 mL/Hr) IV Continuous <Continuous>  magnesium sulfate Infusion 2 Gm/Hr (50 mL/Hr) IV Continuous <Continuous>  NIFEdipine XL 30 milliGRAM(s) Oral daily  prenatal multivitamin 1 Tablet(s) Oral daily  sodium chloride 0.9% lock flush 3 milliLiter(s) IV Push every 8 hours      Labs:  Blood type: O Positive  Rubella IgG: RPR: Negative                          10.2<L>   15.46<H> >-----------< 219    (  @ 14:30 )             31.5<L>                        10.3<L>   10.61<H> >-----------< 231    (  @ 12:15 )             31.2<L>    11-24-22 @ 14:30      139  |  108<H>  |  8   ----------------------------<  96  3.9   |  16<L>  |  0.77        Ca    8.8      2022 14:30    TPro  6.0  /  Alb  3.3  /  TBili  0.2  /  DBili  x   /  AST  21  /  ALT  13  /  AlkPhos  164<H>  22 @ 14:30          Physical Exam:  General: No acute distress. Lying in bed comfortably   Respiratory: No respiratory distress. Unlabored breathing   Abdomen: Soft. Non-tender. Non-distended. Fundus is firm  Extremities: No calf tenderness bilaterally. Trace pitting edema to patella b/l    
No

## 2022-11-26 NOTE — PROGRESS NOTE ADULT - ASSESSMENT
A/P: 31yo PPD#1 s/p  w/ shoulder c/b PEC w/ SF (currently on Mg).  Patient is stable and doing well post-partum.   - Pain well controlled, continue current pain regimen  - Increase ambulation as tolerated  - Continue regular diet    #PEC w/ SF   - Continue w/ 24hrs of Mg postpartum  - BPs 100-120s/70s  - Continue w/ Nifedipine 30mg XL qd     Saúl Castro, PGY-1  Ob/Gyn
A/P: 31yo PPD#2 s/p  w/ shoulder c/b PEC w/ SF, now status post magnesium infusion.  Patient is stable and doing well post-partum.     #Postpartum state  - Pain well controlled, continue current pain regimen  - Increase ambulation as tolerated  - Continue regular diet    #sPEC  - Now s/p MgSO4  - BPs 120-130/60-80s overnight  - Patient with headache overnight that improved with Tylenol  - No symptoms of PEC at this time  - Continue Procardia 30XL and Labetalol 200BID    MICHEL Ruano PGY1

## 2022-11-26 NOTE — DISCHARGE NOTE OB - MEDICATION SUMMARY - MEDICATIONS TO TAKE
I will START or STAY ON the medications listed below when I get home from the hospital:    Blood Pressure monitor   -- Electronic Blood Pressure X 1  Please check blood pressures three times a day.  Notify MD for blood pressures greater than 140/90. Return to hospital for blood pressures greater than 160/110.  -- Indication: For Hypertension    acetaminophen 325 mg oral tablet  -- 3 tab(s) by mouth every 6 hours, As Needed  -- Indication: For pain    ibuprofen 600 mg oral tablet  -- 1 tab(s) by mouth every 6 hours, As Needed  -- Indication: For pain    labetalol 200 mg oral tablet  -- 1 tab(s) by mouth every 12 hours  -- Indication: For Hypertension    NIFEdipine 30 mg oral tablet, extended release  -- 1 tab(s) by mouth once a day  -- Indication: For hypertension   I will START or STAY ON the medications listed below when I get home from the hospital:    Blood Pressure monitor   -- Electronic Blood Pressure X 1  Please check blood pressures three times a day.  Notify MD for blood pressures greater than 140/90. Return to hospital for blood pressures greater than 160/110.  -- Indication: For Hypertension    acetaminophen 325 mg oral tablet  -- 3 tab(s) by mouth every 6 hours, As Needed  -- Indication: For pain    ibuprofen 600 mg oral tablet  -- 1 tab(s) by mouth every 6 hours, As Needed  -- Indication: For pain    labetalol 200 mg oral tablet  -- 1 tab(s) by mouth every 12 hours  -- Indication: For hypertension    NIFEdipine 30 mg oral tablet, extended release  -- 1 tab(s) by mouth once a day  -- Indication: For hypertension

## 2022-11-26 NOTE — PROGRESS NOTE ADULT - ATTENDING COMMENTS
PT SEEN AND EVALUATED  DOING WELL  BP STABLE ON LABETALOL AND PROCARDIA----CONTINUE SAME AT HOME  DC HOME TODAY  F/U OFFICE 1 WEEK FOR BP CHECK

## 2022-11-29 ENCOUNTER — APPOINTMENT (OUTPATIENT)
Dept: ANTEPARTUM | Facility: CLINIC | Age: 32
End: 2022-11-29

## 2022-12-10 LAB — SURGICAL PATHOLOGY STUDY: SIGNIFICANT CHANGE UP

## 2024-10-20 NOTE — OB PROVIDER DELIVERY SUMMARY - NSPROCMANEUVERSA_OBGYN_ALL_OB
Episiotomy/Suprapubic pressure/Arian (Legs flexed back) Episiotomy/Suprapubic pressure/Arian (Legs flexed back)/Fundal pressure NOT applied no